# Patient Record
Sex: FEMALE
[De-identification: names, ages, dates, MRNs, and addresses within clinical notes are randomized per-mention and may not be internally consistent; named-entity substitution may affect disease eponyms.]

---

## 2020-05-08 ENCOUNTER — NURSE TRIAGE (OUTPATIENT)
Dept: OTHER | Facility: CLINIC | Age: 30
End: 2020-05-08

## 2020-05-08 NOTE — TELEPHONE ENCOUNTER
Reason for Disposition   Followed an ear injury   Painful insect bite    Answer Assessment - Initial Assessment Questions  1. LOCATION: \"Which ear is involved? \"      Left. 2. ONSET: \"When did the ear start hurting\"       0225 this morning. 3. SEVERITY: \"How bad is the pain? \"  (Scale 1-10; mild, moderate or severe)    - MILD (1-3): doesn't interfere with normal activities     - MODERATE (4-7): interferes with normal activities or awakens from sleep     - SEVERE (8-10): excruciating pain, unable to do any normal activities       Mild at this time. Moderate at time of onset. 4. URI SYMPTOMS: \" Do you have a runny nose or cough? \"      No.  5. FEVER: \"Do you have a fever? \" If so, ask: \"What is your temperature, how was it measured, and when did it start? \"      No.  6. CAUSE: \"Have you been swimming recently? \", \"How often do you use Q-TIPS? \", \"Have you had any recent air travel or scuba diving? \"      Pt suspects possible insect bite of ear, but did not see insect. Rare use of q tips to clean outer ear. 7. OTHER SYMPTOMS: \"Do you have any other symptoms? \" (e.g., headache, stiff neck, dizziness, vomiting, runny nose, decreased hearing)      No.  8. PREGNANCY: \"Is there any chance you are pregnant? \" \"When was your last menstrual period? \"      No.  Currently menstruating. Answer Assessment - Initial Assessment Questions  1. MECHANISM: \"How did the injury happen? \"       Pt suspects insect bite, although no insect was seen. 2. ONSET: \"When did the injury happen? \" (Minutes or hours ago)       20 minutes ago. 3. LOCATION: \"What part of the ear is injured? \"       Pt describes earlobe and outer ear appearing red and swollen initially, but has since returned to normal appearance. 4. APPEARANCE: \"What does the ear look like? \"       Pt describes earlobe and outer ear appearing red and swollen initially, but has since returned to normal appearance. Perhaps slightly reddened on earlobe.   5. HEARING: \"Was the hearing damaged? \"       No.  6. SIZE: For cuts, bruises, or swelling, ask: \"How large is it? \" (e.g., inches or centimeters)      Swelling at time of onset. Rapidly resolved over 20 minutes. Pt states possibly minor redness/swelling at this time, \"nothing like it was\". 7. PAIN: \"Is it painful? \" If so, ask: \"How bad is the pain? \"    (e.g., Scale 1-10; or mild, moderate, severe)      Mild, per pt. 8. TETANUS: For any breaks in the skin, ask: \"When was the last tetanus booster? \"      N/a.  9. OTHER SYMPTOMS: \"Do you have any other symptoms? \" (e.g., neck pain, headache, loss of consciousness)      No.      10. PREGNANCY: \"Is there any chance you are pregnant? \" \"When was your last menstrual period? \"        No. Currently menstruating. Answer Assessment - Initial Assessment Questions  1. TYPE of INSECT: \"What type of insect was it? \"       Pt unsure. Suspects insect bite, although no insect seen. 2. ONSET: \"When did you get bitten? \"       Potentially 0220 this AM.  3. LOCATION: \"Where is the insect bite located? \"       Left inner ear. 4. REDNESS: \"Is the area red or pink? \" If so, ask \"What size is area of redness? \" (inches or cm). \"When did the redness start? \"      Pt reports initially red and swollen, with rapid improvement over 20 minutes. Pt states top of ear and \"a little bit of outside\" was red at time of incident. Possibly slightly pink and tender at this time. \"Nothing like it was\". 5. PAIN: \"Is there any pain? \" If so, ask: \"How bad is it? \"  (Scale 1-10; or mild, moderate, severe)      Mild, per pt.  6. ITCHING: \"Does it itch? \" If so, ask: \"How bad is the itch? \"     - MILD: doesn't interfere with normal activities    - MODERATE - SEVERE: interferes with work, school, sleep, or other activities       No.  Initial itching relieved after q tip use. 7. SWELLING: \"How big is the swelling? \" (inches, cm, or compare to coins)      Not currently. 8. OTHER SYMPTOMS: \"Do you have any other symptoms? \"  (e.g., difficulty breathing, hives)      No.  9. PREGNANCY: \"Is there any chance you are pregnant? \" \"When was your last menstrual period? \"      No.  Currently menstruating. Protocols used: EARACHE-ADULT-AH, INSECT BITE-ADULT-AH, EAR INJURY-ADULT-AH    Call received from pt who describes suspected/possible insect bite to left early 10-15 minutes prior to calling. Pt states while sleeping she experienced a \"wave of warmth\" on ear, followed by swelling, redness, itching and tenderness. Pt states after cleansing inner ear with q tip all symptoms rapidly improved. At time of call pt experiencing very mild tenderness to touch and slightly pink out ear, but states markedly improved from previous. Pt suspicious d/t insect bite, but no insect was located or seen in dark bedroom. Per protocol, recommended home care with close monitoring of site. Pt verbalizes understanding and agreeable to POC. Denies further questions/concerns at thsi time.

## 2021-02-11 ENCOUNTER — NURSE TRIAGE (OUTPATIENT)
Dept: OTHER | Facility: CLINIC | Age: 31
End: 2021-02-11

## 2021-02-11 NOTE — TELEPHONE ENCOUNTER
Pt states she was recently dx with muscle strain of the back and is having pins and needles in her legs. Pt does states she told her PCP about symptoms and does not say that it is more worse. Pt recommended to talk with PCP regarding continued symptoms. Pt voices understanding. Reason for Disposition   Caller has already spoken with the PCP and has no further questions.     Protocols used: NO CONTACT OR DUPLICATE CONTACT CALL-ADULT-

## 2021-09-04 ENCOUNTER — NURSE TRIAGE (OUTPATIENT)
Dept: OTHER | Facility: CLINIC | Age: 31
End: 2021-09-04

## 2021-09-04 NOTE — TELEPHONE ENCOUNTER
Reason for Disposition   Caller has medication question only, adult not sick, and triager answers question    Answer Assessment - Initial Assessment Questions  1. NAME of MEDICATION: \"What medicine are you calling about? \"  Terbinafine    2. QUESTION: Flip Bales is your question? \"    Can I take it with tylenol or ibuprofen or aleve? 3. PRESCRIBING HCP: \"Who prescribed it? \" Reason: if prescribed by specialist, call should be referred to that group. not discussed    4. SYMPTOMS: \"Do you have any symptoms? \"  None    5. SEVERITY: If symptoms are present, ask \"Are they mild, moderate or severe? \"  N/a    6. PREGNANCY:  \"Is there any chance that you are pregnant? \" \"When was your last menstrual period? \"     n/a    Protocols used: MEDICATION QUESTION CALL-ADULT-    Brief description of triage: medication question about OTC pain relievers and Lamisil. Triage indicates for patient to follow home care advice. Using [employer], informed caller there was no interaction with any other the 3 over the counter pain relievers but did advise not to exceed or go near max daily dosing of the over the counter medications due to potential liver issues, as Lamisil can cause liver issues too. Caller states her MD is getting her liver enzymes checked in 6 weeks. Care advice provided, patient verbalizes understanding; denies any other questions or concerns; instructed to call back for any new or worsening symptoms. This triage is a result of a call to [address] [place] [address]. Please do not respond to the triage nurse through this encounter. Any subsequent communication should be directly with the patient.

## 2022-05-22 ENCOUNTER — NURSE TRIAGE (OUTPATIENT)
Dept: OTHER | Facility: CLINIC | Age: 32
End: 2022-05-22

## 2022-05-22 NOTE — TELEPHONE ENCOUNTER
Subjective: Caller states \"thinks she needs to be seen due to sinus infection\"     Current Symptoms: pain in face and under left eye, cheek pain,feels similar to toothache, congestion, sore throat, runny nose, pain when coughing in chest    Onset: 2 weeks ago; gradual    Associated Symptoms: reduced activity    Pain Severity: 7/10; aching, pressure; constant    Temperature: no temp by unknown method    What has been tried: tylenol severe sinus, claritin d    LMP: may 8 Pregnant: No    Recommended disposition: See PCP within 3 Days    Care advice provided, patient verbalizes understanding; denies any other questions or concerns; instructed to call back for any new or worsening symptoms. Patient/caller agrees to follow-up with PCP     This triage is a result of a call to [address] [place] [address]. Please do not respond to the triage nurse through this encounter. Any subsequent communication should be directly with the patient. Reason for Disposition   [1] Sinus congestion (pressure, fullness) AND [2] present > 10 days   [1] Chest pain(s) lasting a few seconds from coughing AND [2] persists > 3 days    Answer Assessment - Initial Assessment Questions  1. LOCATION: \"Where does it hurt? \"         pain in face and under left eye, cheek pain,feels similar to toothache, congestion, sore throat, runny nose      2. ONSET: \"When did the sinus pain start? \"  (e.g., hours, days)         2 weeks ago     3. SEVERITY: \"How bad is the pain? \"   (Scale 1-10; mild, moderate or severe)    - MILD (1-3): doesn't interfere with normal activities     - MODERATE (4-7): interferes with normal activities (e.g., work or school) or awakens from sleep    - SEVERE (8-10): excruciating pain and patient unable to do any normal activities           5-7    4. RECURRENT SYMPTOM: \"Have you ever had sinus problems before? \" If Yes, ask: \"When was the last time? \" and \"What happened that time? \"            Yes, last time was Last Spring, received antibiotics     5. NASAL CONGESTION: \"Is the nose blocked? \" If Yes, ask: \"Can you open it or must you breathe through the mouth? \"       Yes left nostril, breath through mouth    6. NASAL DISCHARGE: \"Do you have discharge from your nose? \" If so ask, \"What color? \"        Yes, yellow    7. FEVER: \"Do you have a fever? \" If Yes, ask: \"What is it, how was it measured, and when did it start? \"         No    8. OTHER SYMPTOMS: \"Do you have any other symptoms? \" (e.g., sore throat, cough, earache, difficulty breathing)        Sore throat, cough,     9. PREGNANCY: \"Is there any chance you are pregnant? \" \"When was your last menstrual period? \"        No lmp may 8    Answer Assessment - Initial Assessment Questions  1. LOCATION: \"Where does it hurt? \"          Chest upper portion    2. RADIATION: \"Does the pain go anywhere else? \" (e.g., into neck, jaw, arms, back)        No    3. ONSET: \"When did the chest pain begin? \" (Minutes, hours or days)         Tonight when coughing    4. PATTERN \"Does the pain come and go, or has it been constant since it started? \"  \"Does it get worse with exertion? \"         Only when coughing    5. DURATION: \"How long does it last\" (e.g., seconds, minutes, hours)        As long as coughing    6. SEVERITY: \"How bad is the pain? \"  (e.g., Scale 1-10; mild, moderate, or severe)     - MILD (1-3): doesn't interfere with normal activities      - MODERATE (4-7): interferes with normal activities or awakens from sleep     - SEVERE (8-10): excruciating pain, unable to do any normal activities          5-7 feels like an upper respiratory infection    7. CARDIAC RISK FACTORS: \"Do you have any history of heart problems or risk factors for heart disease? \" (e.g., angina, prior heart attack; diabetes, high blood pressure, high cholesterol, smoker, or strong family history of heart disease)        No    8. PULMONARY RISK FACTORS: \"Do you have any history of lung disease? \"  (e.g., blood clots in lung, asthma, emphysema, birth control pills)        Sesasonal bronchitis    9. CAUSE: \"What do you think is causing the chest pain? \"        URI    10. OTHER SYMPTOMS: \"Do you have any other symptoms? \" (e.g., dizziness, nausea, vomiting, sweating, fever, difficulty breathing, cough)          None    11. PREGNANCY: \"Is there any chance you are pregnant? \" \"When was your last menstrual period? \"          No.lmp 5/8    Protocols used: SINUS PAIN OR CONGESTION-ADULT-AH, CHEST PAIN-ADULT-AH

## 2022-06-03 ENCOUNTER — NURSE TRIAGE (OUTPATIENT)
Dept: OTHER | Facility: CLINIC | Age: 32
End: 2022-06-03

## 2022-06-03 NOTE — TELEPHONE ENCOUNTER
has covid and wanted to know if she needed to wear a mask while she is in the other room.      Reason for Disposition   COVID -19 Disease, questions about    Protocols used: CORONAVIRUS (COVID-19) EXPOSURE-ADULT-AH